# Patient Record
Sex: MALE | Race: WHITE | NOT HISPANIC OR LATINO | Employment: UNEMPLOYED | URBAN - METROPOLITAN AREA
[De-identification: names, ages, dates, MRNs, and addresses within clinical notes are randomized per-mention and may not be internally consistent; named-entity substitution may affect disease eponyms.]

---

## 2020-09-24 ENCOUNTER — OFFICE VISIT (OUTPATIENT)
Dept: GASTROENTEROLOGY | Facility: CLINIC | Age: 40
End: 2020-09-24
Payer: COMMERCIAL

## 2020-09-24 VITALS
SYSTOLIC BLOOD PRESSURE: 124 MMHG | DIASTOLIC BLOOD PRESSURE: 85 MMHG | OXYGEN SATURATION: 98 % | HEART RATE: 76 BPM | HEIGHT: 67 IN | BODY MASS INDEX: 32.8 KG/M2 | RESPIRATION RATE: 18 BRPM | WEIGHT: 209 LBS | TEMPERATURE: 97 F

## 2020-09-24 DIAGNOSIS — K21.9 GASTROESOPHAGEAL REFLUX DISEASE, ESOPHAGITIS PRESENCE NOT SPECIFIED: ICD-10-CM

## 2020-09-24 DIAGNOSIS — R13.10 DYSPHAGIA, UNSPECIFIED TYPE: Primary | ICD-10-CM

## 2020-09-24 PROCEDURE — 99204 PR OFFICE/OUTPT VISIT, NEW, LEVL IV, 45-59 MIN: ICD-10-PCS | Mod: S$GLB,,, | Performed by: INTERNAL MEDICINE

## 2020-09-24 PROCEDURE — 99999 PR PBB SHADOW E&M-NEW PATIENT-LVL IV: CPT | Mod: PBBFAC,,, | Performed by: INTERNAL MEDICINE

## 2020-09-24 PROCEDURE — 99204 OFFICE O/P NEW MOD 45 MIN: CPT | Mod: S$GLB,,, | Performed by: INTERNAL MEDICINE

## 2020-09-24 PROCEDURE — 99999 PR PBB SHADOW E&M-NEW PATIENT-LVL IV: ICD-10-PCS | Mod: PBBFAC,,, | Performed by: INTERNAL MEDICINE

## 2020-09-24 RX ORDER — HYDROXYZINE PAMOATE 25 MG/1
CAPSULE ORAL
COMMUNITY
Start: 2020-08-11

## 2020-09-24 RX ORDER — PANTOPRAZOLE SODIUM 40 MG/1
TABLET, DELAYED RELEASE ORAL
COMMUNITY
Start: 2020-07-14 | End: 2020-09-24 | Stop reason: SDUPTHER

## 2020-09-24 RX ORDER — PANTOPRAZOLE SODIUM 40 MG/1
40 TABLET, DELAYED RELEASE ORAL DAILY
Qty: 90 TABLET | Refills: 3 | Status: SHIPPED | OUTPATIENT
Start: 2020-09-24 | End: 2021-09-24

## 2020-09-24 NOTE — PROGRESS NOTES
Subjective:       Patient ID: Bong Cruz is a 40 y.o. male.    Chief Complaint: Gastroesophageal Reflux (so bad it causes him to vomit after he eats)    He has a history gastroesophageal reflux and has been on the Protonix.  He states he has a long history of pyrosis and dyspepsia.  Recently he had to increase the Protonix to twice a day because the pyrosis and dyspepsia.  He is having episodes of dysphagia that he describes as difficulty with the food going down .  He denies acute obstruction aspiration hematemesis hematochezia jaundice or bleeding.  He describes postprandial vomiting of acidic tasting food.  He states milk sometimes causes the difficulty but he cannot pinpoint a specific food.  He states his symptoms have been going on for 6 months.  He was put on the Protonix because of his pyrosis.  He has never had GI evaluation.  He has daily bowel movements and his weight remains stable he states his mother and father both had have their esophagus stretched .      Allergies:  Review of patient's allergies indicates:  No Known Allergies    Medications:    Current Outpatient Medications:     hydrOXYzine pamoate (VISTARIL) 25 MG Cap, TK 1 C PO QID PRN, Disp: , Rfl:     pantoprazole (PROTONIX) 40 MG tablet, Take 1 tablet (40 mg total) by mouth once daily., Disp: 90 tablet, Rfl: 3    Past Medical History:   Diagnosis Date    GERD (gastroesophageal reflux disease)        Past Surgical History:   Procedure Laterality Date    MANDIBLE SURGERY Bilateral     metal plate both sides    SHOULDER SURGERY Right          Review of Systems   Constitutional: Negative for appetite change, fever and unexpected weight change.   HENT: Positive for trouble swallowing.         No jaundice.   Respiratory: Negative for cough, shortness of breath and wheezing.         He has never been a cigarette smoker.  He denies dysphagia aspiration chronic cough chronic sputum production or dyspnea on exertion.  He does consume alcohol  in the form of beer he uses the smokeless tobacco.   Cardiovascular: Negative for chest pain.        History of hypertension he states has been well controlled.  He denies exertional chest pain or rhythm disturbance.   Gastrointestinal: Positive for nausea and vomiting. Negative for abdominal distention, abdominal pain, anal bleeding, blood in stool, constipation, diarrhea and rectal pain.        He generally has daily bowel movements.  He denies abdominal pain.  He experiences postprandial nausea with vomiting but denies aspiration.   Musculoskeletal: Positive for arthralgias. Negative for back pain and neck pain.   Skin: Negative for pallor and rash.   Neurological: Negative for dizziness, seizures, syncope, speech difficulty, weakness and numbness.        Recent evaluation for headache and facial numbness.  The emergency room evaluation was negative with negative CT scan.   Hematological: Negative for adenopathy.   Psychiatric/Behavioral: Negative for confusion.        He states his of the usual stresses.  He is a  works on the farm.       Objective:      Physical Exam  Vitals signs reviewed.   Constitutional:       Appearance: He is well-developed.      Comments: Well-nourished well-hydrated overweight afebrile nonicteric white male.  He is normocephalic.  Pupils are normal.  He is sitting comfortably in the chair.  He is oriented x3.  He can relate his history and answer questions appropriately.   HENT:      Head: Normocephalic.   Eyes:      Pupils: Pupils are equal, round, and reactive to light.   Neck:      Musculoskeletal: Normal range of motion and neck supple.      Thyroid: No thyromegaly.      Trachea: No tracheal deviation.   Cardiovascular:      Rate and Rhythm: Normal rate and regular rhythm.      Heart sounds: Normal heart sounds.   Pulmonary:      Effort: Pulmonary effort is normal.      Breath sounds: Normal breath sounds.   Abdominal:      General: Bowel sounds are normal. There is no  distension.      Palpations: Abdomen is soft. There is no mass.      Tenderness: There is no abdominal tenderness. There is no guarding or rebound.      Hernia: No hernia is present.      Comments: The abdomen is soft obese without tenderness masses organomegaly.  Bowel sounds are normal.   Musculoskeletal: Normal range of motion.      Comments: He can ambulate normally.  He can go from the sitting the standing position without difficulty.   Lymphadenopathy:      Cervical: No cervical adenopathy.   Skin:     General: Skin is warm and dry.   Neurological:      Mental Status: He is alert and oriented to person, place, and time.      Cranial Nerves: No cranial nerve deficit.   Psychiatric:         Behavior: Behavior normal.           Plan:       Dysphagia, unspecified type  -     FL Upper GI; Future; Expected date: 09/24/2020    Gastroesophageal reflux disease, esophagitis presence not specified  -     pantoprazole (PROTONIX) 40 MG tablet; Take 1 tablet (40 mg total) by mouth once daily.  Dispense: 90 tablet; Refill: 3     He will continue his reflux regimen and the Protonix.  Barium swallow and upper GI x-rays will be scheduled.  He will attempt to obtain further medical history from his parents who had dysphagia.  He will make a food diary and avoid the offending foods.  I have discussed with him the adverse effects of abdominal adiposity.  He will try to decrease his caloric intake.  He continues his vitamins and minerals.

## 2020-09-24 NOTE — LETTER
September 24, 2020      Eyal Pfeiffer, BRIELLE  [address] [facility] [place] [id number]           [facility]  [address], [address]  [place] [address]  Phone: [phone]  Fax: [phone]          Patient: Bong Cruz   MR Number: [id number]   YOB: 1980   Date of Visit: 9/24/2020       Dear Eyal Pfeiffer:    Thank you for referring Bong Cruz to me for evaluation. Attached you will find relevant portions of my assessment and plan of care.    If you have questions, please do not hesitate to call me. I look forward to following Bong Cruz along with you.    Sincerely,    Satya Matson MD    Enclosure  CC:  No Recipients    If you would like to receive this communication electronically, please contact [email or web address] or [phone] to request more information on [employer] Link access.    For providers and/or their staff who would like to refer a patient to [facility], please contact us through our one-stop-shop provider referral line, [facility], at [phone].    If you feel you have received this communication in error or would no longer like to receive these types of communications, please e-mail [email or web address]

## 2020-09-24 NOTE — PATIENT INSTRUCTIONS
He will continue his reflux regimen.  He will be scheduled for upper GI x-rays to evaluate the dysphagia.  He can continue the Protonix twice a day.  He will try to make a food diary and pinpoint the offending foods that cause the difficulty with the nausea vomiting

## 2020-09-30 ENCOUNTER — HOSPITAL ENCOUNTER (OUTPATIENT)
Dept: RADIOLOGY | Facility: HOSPITAL | Age: 40
Discharge: HOME | End: 2020-09-30
Attending: INTERNAL MEDICINE
Payer: COMMERCIAL

## 2020-09-30 DIAGNOSIS — R13.10 DYSPHAGIA, UNSPECIFIED TYPE: ICD-10-CM

## 2020-09-30 PROCEDURE — 25500020 PHARM REV CODE 255: Performed by: INTERNAL MEDICINE

## 2020-09-30 PROCEDURE — 74240 X-RAY XM UPR GI TRC 1CNTRST: CPT | Mod: 26,,, | Performed by: RADIOLOGY

## 2020-09-30 PROCEDURE — 74240 FL UPPER GI: ICD-10-PCS | Mod: 26,,, | Performed by: RADIOLOGY

## 2020-09-30 PROCEDURE — A9698 NON-RAD CONTRAST MATERIALNOC: HCPCS | Performed by: INTERNAL MEDICINE

## 2020-09-30 PROCEDURE — 74240 X-RAY XM UPR GI TRC 1CNTRST: CPT | Mod: TC,FY

## 2020-09-30 RX ADMIN — BARIUM SULFATE 140 ML: 980 POWDER, FOR SUSPENSION ORAL at 10:09

## 2020-09-30 RX ADMIN — BARIUM SULFATE 355 ML: 0.6 SUSPENSION ORAL at 10:09

## 2020-10-02 DIAGNOSIS — K21.9 GASTROESOPHAGEAL REFLUX DISEASE, UNSPECIFIED WHETHER ESOPHAGITIS PRESENT: Primary | ICD-10-CM

## 2020-10-02 DIAGNOSIS — K44.9 HIATAL HERNIA: ICD-10-CM

## 2020-10-02 DIAGNOSIS — Z01.818 PREOP TESTING: ICD-10-CM

## 2020-10-02 DIAGNOSIS — K21.9 GERD (GASTROESOPHAGEAL REFLUX DISEASE): ICD-10-CM

## 2020-10-13 ENCOUNTER — LAB VISIT (OUTPATIENT)
Dept: FAMILY MEDICINE | Facility: CLINIC | Age: 40
End: 2020-10-13
Payer: COMMERCIAL

## 2020-10-13 DIAGNOSIS — Z01.818 PREOP TESTING: ICD-10-CM

## 2020-10-13 LAB — SARS-COV-2 RNA RESP QL NAA+PROBE: NOT DETECTED

## 2020-10-13 PROCEDURE — U0003 INFECTIOUS AGENT DETECTION BY NUCLEIC ACID (DNA OR RNA); SEVERE ACUTE RESPIRATORY SYNDROME CORONAVIRUS 2 (SARS-COV-2) (CORONAVIRUS DISEASE [COVID-19]), AMPLIFIED PROBE TECHNIQUE, MAKING USE OF HIGH THROUGHPUT TECHNOLOGIES AS DESCRIBED BY CMS-2020-01-R: HCPCS

## 2020-10-16 ENCOUNTER — HOSPITAL ENCOUNTER (OUTPATIENT)
Facility: HOSPITAL | Age: 40
Discharge: HOME | End: 2020-10-16
Attending: INTERNAL MEDICINE | Admitting: INTERNAL MEDICINE
Payer: COMMERCIAL

## 2020-10-16 ENCOUNTER — ANESTHESIA EVENT (OUTPATIENT)
Dept: SURGERY | Facility: HOSPITAL | Age: 40
End: 2020-10-16
Payer: COMMERCIAL

## 2020-10-16 ENCOUNTER — ANESTHESIA (OUTPATIENT)
Dept: SURGERY | Facility: HOSPITAL | Age: 40
End: 2020-10-16
Payer: COMMERCIAL

## 2020-10-16 VITALS
HEART RATE: 81 BPM | OXYGEN SATURATION: 100 % | WEIGHT: 200 LBS | DIASTOLIC BLOOD PRESSURE: 90 MMHG | SYSTOLIC BLOOD PRESSURE: 110 MMHG | TEMPERATURE: 98 F | RESPIRATION RATE: 19 BRPM | BODY MASS INDEX: 31.39 KG/M2 | HEIGHT: 67 IN

## 2020-10-16 DIAGNOSIS — K21.9 GERD (GASTROESOPHAGEAL REFLUX DISEASE): ICD-10-CM

## 2020-10-16 DIAGNOSIS — K21.9 GASTROESOPHAGEAL REFLUX DISEASE, UNSPECIFIED WHETHER ESOPHAGITIS PRESENT: ICD-10-CM

## 2020-10-16 DIAGNOSIS — K44.9 HIATAL HERNIA: ICD-10-CM

## 2020-10-16 PROCEDURE — D9220A PRA ANESTHESIA: ICD-10-PCS | Mod: ,,, | Performed by: ANESTHESIOLOGY

## 2020-10-16 PROCEDURE — 37000008 HC ANESTHESIA 1ST 15 MINUTES: Performed by: INTERNAL MEDICINE

## 2020-10-16 PROCEDURE — 99900103 DSU ONLY-NO CHARGE-INITIAL HR (STAT)

## 2020-10-16 PROCEDURE — D9220A PRA ANESTHESIA: Mod: ,,, | Performed by: ANESTHESIOLOGY

## 2020-10-16 PROCEDURE — 43239 EGD BIOPSY SINGLE/MULTIPLE: CPT | Performed by: INTERNAL MEDICINE

## 2020-10-16 PROCEDURE — 25000003 PHARM REV CODE 250: Performed by: NURSE ANESTHETIST, CERTIFIED REGISTERED

## 2020-10-16 PROCEDURE — 88305 TISSUE EXAM BY PATHOLOGIST: CPT | Mod: 26,,, | Performed by: PATHOLOGY

## 2020-10-16 PROCEDURE — 43239 EGD BIOPSY SINGLE/MULTIPLE: CPT | Mod: ,,, | Performed by: INTERNAL MEDICINE

## 2020-10-16 PROCEDURE — 27201423 OPTIME MED/SURG SUP & DEVICES STERILE SUPPLY: Performed by: INTERNAL MEDICINE

## 2020-10-16 PROCEDURE — 88305 TISSUE EXAM BY PATHOLOGIST: CPT | Performed by: PATHOLOGY

## 2020-10-16 PROCEDURE — 88305 TISSUE EXAM BY PATHOLOGIST: ICD-10-PCS | Mod: 26,,, | Performed by: PATHOLOGY

## 2020-10-16 PROCEDURE — 43239 PR EGD, FLEX, W/BIOPSY, SGL/MULTI: ICD-10-PCS | Mod: ,,, | Performed by: INTERNAL MEDICINE

## 2020-10-16 PROCEDURE — 37000009 HC ANESTHESIA EA ADD 15 MINS: Performed by: INTERNAL MEDICINE

## 2020-10-16 PROCEDURE — 63600175 PHARM REV CODE 636 W HCPCS: Performed by: NURSE ANESTHETIST, CERTIFIED REGISTERED

## 2020-10-16 RX ORDER — SODIUM CHLORIDE, SODIUM LACTATE, POTASSIUM CHLORIDE, CALCIUM CHLORIDE 600; 310; 30; 20 MG/100ML; MG/100ML; MG/100ML; MG/100ML
125 INJECTION, SOLUTION INTRAVENOUS CONTINUOUS
Status: DISCONTINUED | OUTPATIENT
Start: 2020-10-16 | End: 2020-10-16 | Stop reason: HOSPADM

## 2020-10-16 RX ORDER — LIDOCAINE HYDROCHLORIDE 10 MG/ML
1 INJECTION, SOLUTION EPIDURAL; INFILTRATION; INTRACAUDAL; PERINEURAL ONCE
Status: CANCELLED | OUTPATIENT
Start: 2020-10-16 | End: 2020-10-16

## 2020-10-16 RX ORDER — SODIUM CHLORIDE, SODIUM LACTATE, POTASSIUM CHLORIDE, CALCIUM CHLORIDE 600; 310; 30; 20 MG/100ML; MG/100ML; MG/100ML; MG/100ML
INJECTION, SOLUTION INTRAVENOUS CONTINUOUS PRN
Status: DISCONTINUED | OUTPATIENT
Start: 2020-10-16 | End: 2020-10-16

## 2020-10-16 RX ORDER — ONDANSETRON 2 MG/ML
4 INJECTION INTRAMUSCULAR; INTRAVENOUS DAILY PRN
Status: DISCONTINUED | OUTPATIENT
Start: 2020-10-16 | End: 2020-10-16 | Stop reason: HOSPADM

## 2020-10-16 RX ORDER — LIDOCAINE HYDROCHLORIDE 20 MG/ML
INJECTION INTRAVENOUS
Status: DISCONTINUED | OUTPATIENT
Start: 2020-10-16 | End: 2020-10-16

## 2020-10-16 RX ORDER — PROPOFOL 10 MG/ML
VIAL (ML) INTRAVENOUS
Status: DISCONTINUED | OUTPATIENT
Start: 2020-10-16 | End: 2020-10-16

## 2020-10-16 RX ORDER — SODIUM CHLORIDE, SODIUM LACTATE, POTASSIUM CHLORIDE, CALCIUM CHLORIDE 600; 310; 30; 20 MG/100ML; MG/100ML; MG/100ML; MG/100ML
INJECTION, SOLUTION INTRAVENOUS CONTINUOUS
Status: CANCELLED | OUTPATIENT
Start: 2020-10-16

## 2020-10-16 RX ADMIN — PROPOFOL 50 MG: 10 INJECTION, EMULSION INTRAVENOUS at 01:10

## 2020-10-16 RX ADMIN — LIDOCAINE HYDROCHLORIDE 100 MG: 20 INJECTION, SOLUTION INTRAVENOUS at 01:10

## 2020-10-16 RX ADMIN — SODIUM CHLORIDE, POTASSIUM CHLORIDE, SODIUM LACTATE AND CALCIUM CHLORIDE: 600; 310; 30; 20 INJECTION, SOLUTION INTRAVENOUS at 01:10

## 2020-10-16 NOTE — TRANSFER OF CARE
"Anesthesia Transfer of Care Note    Patient: Bnog Cruz    Procedure(s) Performed: Procedure(s) (LRB):  EGD (ESOPHAGOGASTRODUODENOSCOPY) with GASTRIC BIOPSY (N/A)    Patient location: PACU    Anesthesia Type: general    Transport from OR: Transported from OR on room air with adequate spontaneous ventilation    Post pain: adequate analgesia    Post assessment: no apparent anesthetic complications and tolerated procedure well    Post vital signs: stable    Level of consciousness: awake, oriented and alert    Nausea/Vomiting: no nausea/vomiting    Complications: none    Transfer of care protocol was followed      Last vitals:   Visit Vitals  BP (!) 127/93   Pulse 66   Temp 36.6 °C (97.9 °F) (Oral)   Resp (!) 24   Ht 5' 7" (1.702 m)   Wt 90.7 kg (200 lb)   SpO2 100%   BMI 31.32 kg/m²     "

## 2020-10-16 NOTE — ANESTHESIA PREPROCEDURE EVALUATION
10/16/2020  Bong Cruz is a 40 y.o., male.    Anesthesia Evaluation    I have reviewed the Patient Summary Reports.    I have reviewed the Nursing Notes.    I have reviewed the Medications.     Review of Systems  Anesthesia Hx:  No problems with previous Anesthesia Hx of Anesthetic complications   Grandfather has Malignant Hyperthermia Neg history of prior surgery. Denies Family Hx of Anesthesia complications.   Denies Personal Hx of Anesthesia complications.   Social:  Non-Smoker    Hematology/Oncology:  Hematology Normal   Oncology Normal     EENT/Dental:EENT/Dental Normal   Cardiovascular:  Cardiovascular Normal     Pulmonary:  Pulmonary Normal    Renal/:  Renal/ Normal     Hepatic/GI:   GERD, poorly controlled    Musculoskeletal:  Musculoskeletal Normal    Neurological:  Neurology Normal    Endocrine:  Endocrine Normal    Dermatological:  Skin Normal    Psych:  Psychiatric Normal           Physical Exam  General:  Well nourished    Airway/Jaw/Neck:  Airway Findings: Mouth Opening: Normal Tongue: Normal  General Airway Assessment: Adult  Mallampati: II  TM Distance: 4 - 6 cm        Eyes/Ears/Nose:  EYES/EARS/NOSE FINDINGS: Normal   Dental:  DENTAL FINDINGS: Normal   Chest/Lungs:  Chest/Lungs Clear    Heart/Vascular:  Heart Findings: Normal Heart murmur: negative Vascular Findings: Normal    Abdomen:  Abdomen Findings: Normal    Musculoskeletal:  Musculoskeletal Findings: Normal   Skin:  Skin Findings: Normal    Mental Status:  Mental Status Findings: Normal        Anesthesia Plan  Type of Anesthesia, risks & benefits discussed:  Anesthesia Type:  general  Patient's Preference:   Intra-op Monitoring Plan: standard ASA monitors  Intra-op Monitoring Plan Comments:   Post Op Pain Control Plan:   Post Op Pain Control Plan Comments:   Induction:   IV  Beta Blocker:  Patient is not currently on a  Beta-Blocker (No further documentation required).       Informed Consent: Patient understands risks and agrees with Anesthesia plan.  Questions answered. Anesthesia consent signed with patient.  ASA Score: 2     Day of Surgery Review of History & Physical: I have interviewed and examined the patient. I have reviewed the patient's H&P dated:    H&P update referred to the provider.         Ready For Surgery From Anesthesia Perspective.

## 2020-10-16 NOTE — PLAN OF CARE
To PACU via stretcher s/p EGD. Connected to PACU monitors-alarms on. Awake and talkative. IV site to right hand. No noted problems to site. IVF infusing via gravity at KVO. AVSS. Will monitor closely.

## 2020-10-16 NOTE — OP NOTE
Procedure.  Esophageal gastroduodenoscopy.  Indications dyspepsia pyrosis and abdominal pain with the discomfort and regurgitation.  He has good health knowledge.  He understands the procedures of upper endoscopy.  Specifically he realizes there is an extremely small incidence of bleeding perforation or aspiration.  Anesthesia mac.  Procedure.  With the patient the procedure room left lateral supine position.  The Olympus video upper endoscope was passed without difficulty.  The hyperemic gastroesophageal junction was at 40-41 cm with the SQ junction at 40-41 cm.  The scope was passed in the stomach.  The the cardia body and antrum were diffusely hyperemic.  There is minimal retained secretions.  The friable pre-pyloric mucosa was biopsied.  Biopsies were obtained from the lesser curvature greater curvature and antrum.  There was minimal deformity of the pylorus.  First and parts of the duodenum were normal.  Impression 1.  Esophagitis LA grade A 2.  Gastritis.  Patient tolerated the procedure well.

## 2020-10-16 NOTE — [employer] PATIENT INSTRUCTIONS
Discharge Summary/Instructions after an Endoscopic Procedure  Patient Name: Bong Cruz  Patient MRN: [id number]  Patient YOB: 1980 Friday, October 16, 2020  Satya Matson MD  RESTRICTIONS:  During your procedure today, you received medications for sedation.  These   medications may affect your judgment, balance and coordination.  Therefore,   for 24 hours, you have the following restrictions:   - DO NOT drive a car, operate machinery, make legal/financial decisions,   sign important papers or drink alcohol.    ACTIVITY:  Today: no heavy lifting, straining or running due to procedural   sedation/anesthesia.  The following day: return to full activity including work.  DIET:  Eat and drink normally unless instructed otherwise.     TREATMENT FOR COMMON SIDE EFFECTS:  - Mild abdominal pain, nausea, belching, bloating or excessive gas:  rest,   eat lightly and use a heating pad.  - Sore Throat: treat with throat lozenges and/or gargle with warm salt   water.  - Because air was used during the procedure, expelling large amounts of air   from your rectum or belching is normal.  - If a bowel prep was taken, you may not have a bowel movement for 1-3 days.    This is normal.  SYMPTOMS TO WATCH FOR AND REPORT TO YOUR PHYSICIAN:  1. Abdominal pain or bloating, other than gas cramps.  2. Chest pain.  3. Back pain.  4. Signs of infection such as: chills or fever occurring within 24 hours   after the procedure.  5. Rectal bleeding, which would show as bright red, maroon, or black stools.   (A tablespoon of blood from the rectum is not serious, especially if   hemorrhoids are present.)  6. Vomiting.  7. Weakness or dizziness.  GO DIRECTLY TO THE NEAREST EMERGENCY ROOM IF YOU HAVE ANY OF THE FOLLOWING:      Difficulty breathing              Chills and/or fever over 101 F   Persistent vomiting and/or vomiting blood   Severe abdominal pain   Severe chest pain   Black, tarry stools   Bleeding- more than one tablespoon   Any  other symptom or condition that you feel may need urgent attention  Your doctor recommends these additional instructions:  If any biopsies were taken, your doctors clinic will contact you in 1 to 2   weeks with any results.  - Discharge patient to home (ambulatory).   - Resume previous diet.  For questions, problems or results please call your physician - Satya Matson MD at Work:  [phone].  [facility] EMERGENCY ROOM PHONE NUMBER: [phone]  IF A COMPLICATION OR EMERGENCY SITUATION ARISES AND YOU ARE UNABLE TO REACH   YOUR PHYSICIAN - GO DIRECTLY TO THE EMERGENCY ROOM.  MD Satya George MD  10/16/2020 1:31:36 PM  This report has been verified and signed electronically.  [employer]

## 2020-10-16 NOTE — H&P
"Office Visit    9/24/2020  [facility]     Satya Matson MD  Gastroenterology  Dysphagia, unspecified type +1 more  Dx  Gastroesophageal Reflux   ; Referred by Eyal Pfeiffer NP  Reason for Visit   Additional Documentation    Vitals:    /85 (BP Location: Right arm, Patient Position: Sitting, BP Method: Medium (Automatic))   Pulse 76   Temp 97.3 °F (36.3 °C) (Temporal)   Resp 18   Ht 5' 7" (1.702 m)   Wt 94.8 kg (209 lb)   SpO2 98%   BMI 32.73 kg/m²   BSA 2.12 m²      More Vitals   Flowsheets:    Anthropometrics      SmartForms:     [facility] AMB - FALL RISK      Encounter Info:    Billing Info,   History,   Allergies,   Detailed Report      Instructions       Follow up in about 2 weeks (around 10/8/2020).  He will continue his reflux regimen.  He will be scheduled for upper GI x-rays to evaluate the dysphagia.  He can continue the Protonix twice a day.  He will try to make a food diary and pinpoint the offending foods that cause the difficulty with the nausea vomiting           After Visit Summary (Printed 9/24/2020)  Progress Notes  Satya Matson MD (Physician)   Gastroenterology   9/24/2020  8:00 AM   Signed     Subjective:       Patient ID: Bong Cruz is a 40 y.o. male.     Chief Complaint: Gastroesophageal Reflux (so bad it causes him to vomit after he eats)     He has a history gastroesophageal reflux and has been on the Protonix.  He states he has a long history of pyrosis and dyspepsia.  Recently he had to increase the Protonix to twice a day because the pyrosis and dyspepsia.  He is having episodes of dysphagia that he describes as difficulty with the food going down .  He denies acute obstruction aspiration hematemesis hematochezia jaundice or bleeding.  He describes postprandial vomiting of acidic tasting food.  He states milk sometimes causes the difficulty but he cannot pinpoint a specific food.  He states his symptoms have been going on for 6 months.  He " was put on the Protonix because of his pyrosis.  He has never had GI evaluation.  He has daily bowel movements and his weight remains stable he states his mother and father both had have their esophagus stretched .        Allergies:  Review of patient's allergies indicates:  No Known Allergies     Medications:     Current Outpatient Medications:     hydrOXYzine pamoate (VISTARIL) 25 MG Cap, TK 1 C PO QID PRN, Disp: , Rfl:     pantoprazole (PROTONIX) 40 MG tablet, Take 1 tablet (40 mg total) by mouth once daily., Disp: 90 tablet, Rfl: 3          Past Medical History:   Diagnosis Date    GERD (gastroesophageal reflux disease)                 Past Surgical History:   Procedure Laterality Date    MANDIBLE SURGERY Bilateral       metal plate both sides    SHOULDER SURGERY Right              Review of Systems   Constitutional: Negative for appetite change, fever and unexpected weight change.   HENT: Positive for trouble swallowing.         No jaundice.   Respiratory: Negative for cough, shortness of breath and wheezing.         He has never been a cigarette smoker.  He denies dysphagia aspiration chronic cough chronic sputum production or dyspnea on exertion.  He does consume alcohol in the form of beer he uses the smokeless tobacco.   Cardiovascular: Negative for chest pain.        History of hypertension he states has been well controlled.  He denies exertional chest pain or rhythm disturbance.   Gastrointestinal: Positive for nausea and vomiting. Negative for abdominal distention, abdominal pain, anal bleeding, blood in stool, constipation, diarrhea and rectal pain.        He generally has daily bowel movements.  He denies abdominal pain.  He experiences postprandial nausea with vomiting but denies aspiration.   Musculoskeletal: Positive for arthralgias. Negative for back pain and neck pain.   Skin: Negative for pallor and rash.   Neurological: Negative for dizziness, seizures, syncope, speech difficulty,  weakness and numbness.        Recent evaluation for headache and facial numbness.  The emergency room evaluation was negative with negative CT scan.   Hematological: Negative for adenopathy.   Psychiatric/Behavioral: Negative for confusion.        He states his of the usual stresses.  He is a  works on the farm.       Objective:   Physical Exam  Vitals signs reviewed.   Constitutional:       Appearance: He is well-developed.      Comments: Well-nourished well-hydrated overweight afebrile nonicteric white male.  He is normocephalic.  Pupils are normal.  He is sitting comfortably in the chair.  He is oriented x3.  He can relate his history and answer questions appropriately.   HENT:      Head: Normocephalic.   Eyes:      Pupils: Pupils are equal, round, and reactive to light.   Neck:      Musculoskeletal: Normal range of motion and neck supple.      Thyroid: No thyromegaly.      Trachea: No tracheal deviation.   Cardiovascular:      Rate and Rhythm: Normal rate and regular rhythm.      Heart sounds: Normal heart sounds.   Pulmonary:      Effort: Pulmonary effort is normal.      Breath sounds: Normal breath sounds.   Abdominal:      General: Bowel sounds are normal. There is no distension.      Palpations: Abdomen is soft. There is no mass.      Tenderness: There is no abdominal tenderness. There is no guarding or rebound.      Hernia: No hernia is present.      Comments: The abdomen is soft obese without tenderness masses organomegaly.  Bowel sounds are normal.   Musculoskeletal: Normal range of motion.      Comments: He can ambulate normally.  He can go from the sitting the standing position without difficulty.   Lymphadenopathy:      Cervical: No cervical adenopathy.   Skin:     General: Skin is warm and dry.   Neurological:      Mental Status: He is alert and oriented to person, place, and time.      Cranial Nerves: No cranial nerve deficit.   Psychiatric:         Behavior: Behavior normal.              Plan:       Dysphagia, unspecified type  -     FL Upper GI; Future; Expected date: 09/24/2020     Gastroesophageal reflux disease, esophagitis presence not specified  -     pantoprazole (PROTONIX) 40 MG tablet; Take 1 tablet (40 mg total) by mouth once daily.  Dispense: 90 tablet; Refill: 3      He will continue his reflux regimen and the Protonix.  Barium swallow and upper GI x-rays will be scheduled.  He will attempt to obtain further medical history from his parents who had dysphagia.  He will make a food diary and avoid the offending foods.  I have discussed with him the adverse effects of abdominal adiposity.  He will try to decrease his caloric intake.  He continues his vitamins and minerals.             Other Notes     All notes  Communications       Letter sent to Eyal Pfeiffer NP    AMB Visit Summary: Provider Version  New Media    Scan on 9/24/2020 7:49 AM by Laurel Munoz   Not recorded   All Charges for This Encounter    Code Description Service Date Service Provider Modifiers Qty   18150 [place] OFFICE/OUTPT VISIT,GALINALEVL IV 9/24/2020 Satya Matson MD S$GLB 1   [id number] LA PBB SHADOW E&M-NEW PATIENT-LVL IV 9/24/2020 Satya Matson MD PBBFAC 1   Level of Service    Level of Service   LA OFFICE/OUTPT VISIT,FRANCIA MOJICAL IV [11251]   BestPractice Advisories    Click to view BestPractice Advisory history   AVS Reports    Date/Time Report Action User   9/24/2020  8:21 AM After Visit Summary Printed Pratibha Cevallos LPN   Encounter-Level Documents - 09/24/2020:    After Visit Summary - Document on 9/24/2020 8:21 AM by Pratibha Cevallos LPN: After Visit Summary  Orders Placed       FL Upper GI  Medication Changes       pantoprazole sodium 40 mg Oral Daily      Medication List   Fall Risk    Patient Mobility Status: Ambulatory   Number of falls in the past 12 months?: 0   Fall Risk?: No  Visit Diagnoses       Dysphagia, unspecified type     Gastroesophageal reflux disease, esophagitis presence not specified      Problem List   He is here for upper endoscopy.  He has had pyrosis dyspepsia and occasional dysphagia.  He appears to have gastroesophageal reflux and possibly a motility disorder of the esophagus.  He has good health knowledge.  He understands the procedures of upper endoscopy and esophageal dilatation.  Specifically he realizes there is an extremely small incidence of bleeding perforation or aspiration.  History and physical are unchanged.  Physical examination.  Well-nourished well-hydrated nonicteric male.  He is normocephalic.  Pupils are normal.  Lungs reveal symmetrical respirations.  Heart reveals regular rhythm.  The abdomen is soft with mild tenderness in the epigastrium.  Organomegaly masses are not detected.  Bowel sounds are.  M pressure 1.  Gastroesophageal reflux 2.  Dysphagia.  He

## 2020-10-16 NOTE — DISCHARGE SUMMARY
[facility]  Discharge Note  Short Stay    Procedure(s) (LRB):  EGD (ESOPHAGOGASTRODUODENOSCOPY) with GASTRIC BIOPSY (N/A)    OUTCOME: Patient tolerated treatment/procedure well without complication and is now ready for discharge.    DISPOSITION: Home or Self Care    FINAL DIAGNOSIS:  <principal problem not specified>    FOLLOWUP: In clinic    DISCHARGE INSTRUCTIONS:  No discharge procedures on file.    Upper endoscopy revealed gastroesophageal reflux and gastritis.  Biopsies are pending.  He will elevate the head of his bed he continues his current medications.  He will add adequate fiber to produced daily bowel movements.  He continues his vitamins and minerals.  He will make a food diary and avoid the offending foods.  He has a followup upon the office.

## 2020-10-17 NOTE — ANESTHESIA POSTPROCEDURE EVALUATION
Anesthesia Post Evaluation    Patient: Bong Cruz    Procedure(s) Performed: Procedure(s) (LRB):  EGD (ESOPHAGOGASTRODUODENOSCOPY) with GASTRIC BIOPSY (N/A)    Final Anesthesia Type: general    Patient location during evaluation: PACU  Patient participation: Yes- Able to Participate  Level of consciousness: awake and awake and alert  Post-procedure vital signs: reviewed and stable  Pain management: adequate  Airway patency: patent    PONV status at discharge: No PONV  Anesthetic complications: no      Cardiovascular status: blood pressure returned to baseline  Respiratory status: unassisted and spontaneous ventilation  Hydration status: euvolemic  Follow-up not needed.          Vitals Value Taken Time   /90 10/16/20 1350   Temp 36.6 °C (97.8 °F) 10/16/20 1330   Pulse 81 10/16/20 1355   Resp 19 10/16/20 1355   SpO2 100 % 10/16/20 1355         Event Time   Out of Recovery 13:34:54         Pain/Tash Score: Tash Score: 10 (10/16/2020  1:35 PM)  Modified Tash Score: 19 (10/16/2020  1:59 PM)

## 2020-10-21 LAB
FINAL PATHOLOGIC DIAGNOSIS: NORMAL
GROSS: NORMAL
Lab: NORMAL

## 2020-10-23 ENCOUNTER — OFFICE VISIT (OUTPATIENT)
Dept: GASTROENTEROLOGY | Facility: CLINIC | Age: 40
End: 2020-10-23
Payer: COMMERCIAL

## 2020-10-23 VITALS
RESPIRATION RATE: 18 BRPM | WEIGHT: 209 LBS | BODY MASS INDEX: 32.8 KG/M2 | SYSTOLIC BLOOD PRESSURE: 140 MMHG | HEIGHT: 67 IN | OXYGEN SATURATION: 98 % | TEMPERATURE: 98 F | DIASTOLIC BLOOD PRESSURE: 87 MMHG | HEART RATE: 73 BPM

## 2020-10-23 DIAGNOSIS — R13.19 ESOPHAGEAL DYSPHAGIA: ICD-10-CM

## 2020-10-23 DIAGNOSIS — K21.9 GASTROESOPHAGEAL REFLUX DISEASE, UNSPECIFIED WHETHER ESOPHAGITIS PRESENT: ICD-10-CM

## 2020-10-23 DIAGNOSIS — R11.0 NAUSEA: ICD-10-CM

## 2020-10-23 DIAGNOSIS — R10.9 ABDOMINAL PAIN, UNSPECIFIED ABDOMINAL LOCATION: Primary | ICD-10-CM

## 2020-10-23 DIAGNOSIS — K21.9 GASTROESOPHAGEAL REFLUX DISEASE WITHOUT ESOPHAGITIS: ICD-10-CM

## 2020-10-23 PROCEDURE — 99999 PR PBB SHADOW E&M-EST. PATIENT-LVL IV: CPT | Mod: PBBFAC,,, | Performed by: INTERNAL MEDICINE

## 2020-10-23 PROCEDURE — 99214 OFFICE O/P EST MOD 30 MIN: CPT | Mod: S$GLB,,, | Performed by: INTERNAL MEDICINE

## 2020-10-23 PROCEDURE — 99999 PR PBB SHADOW E&M-EST. PATIENT-LVL IV: ICD-10-PCS | Mod: PBBFAC,,, | Performed by: INTERNAL MEDICINE

## 2020-10-23 PROCEDURE — 99214 PR OFFICE/OUTPT VISIT, EST, LEVL IV, 30-39 MIN: ICD-10-PCS | Mod: S$GLB,,, | Performed by: INTERNAL MEDICINE

## 2020-10-23 RX ORDER — PROMETHAZINE HYDROCHLORIDE 50 MG/1
50 TABLET ORAL EVERY 6 HOURS PRN
Qty: 100 TABLET | Refills: 0 | Status: SHIPPED | OUTPATIENT
Start: 2020-10-23 | End: 2020-11-22

## 2020-10-23 RX ORDER — SUCRALFATE 1 G/1
1 TABLET ORAL
Qty: 270 TABLET | Refills: 3 | Status: SHIPPED | OUTPATIENT
Start: 2020-10-23 | End: 2021-10-23

## 2020-10-23 NOTE — PATIENT INSTRUCTIONS
He will continue the Protonix and the reflux regimen.  He will avoid milk and milk products.  He will make a food diary and avoid the offending foods.  He is given the Phenergan for the nausea.  Ultrasound will be scheduled to evaluate the gallbladder.  He still has episodic dysphagia and esophageal motility study will be scheduled.  He is started on the Carafate before meals.  He continues his other medications vitamins and minerals.

## 2020-10-23 NOTE — LETTER
October 27, 2020      Eyal Pfeiffer, BRIELLE  [address] [facility] [place] [id number]           [facility]  [address], [address]  [place] [address]  Phone: [phone]  Fax: [phone]          Patient: Bong Cruz   MR Number: [id number]   YOB: 1980   Date of Visit: 10/23/2020       Dear Eyal Pfeiffer:    Thank you for referring Bogn Cruz to me for evaluation. Attached you will find relevant portions of my assessment and plan of care.    If you have questions, please do not hesitate to call me. I look forward to following Bong Cruz along with you.    Sincerely,    Satya Matson MD    Enclosure  CC:  No Recipients    If you would like to receive this communication electronically, please contact [email or web address] or [phone] to request more information on [facility] access.    For providers and/or their staff who would like to refer a patient to [facility], please contact us through our one-stop-shop provider referral line, [facility], at [phone].    If you feel you have received this communication in error or would no longer like to receive these types of communications, please e-mail [email or web address]

## 2020-10-23 NOTE — PROGRESS NOTES
Subjective:       Patient ID: Bong Cruz is a 40 y.o. male.    Chief Complaint: Follow-up (procedure)    Upper endoscopy revealed gastroesophageal reflux and gastritis.  The biopsies were negative for H pylori.  The barium swallow showed a small hiatal.  Obstruction was not seen in the esophagus.  The barium swallow revealed that there was transient delay of the barium tablet in the hypopharynx.  He denies aspiration.  He denies difficulty with solids or liquids but has nonspecific findings of the food goes down slowly .  He has had nausea.  He has occasional vomiting.  His mother states that she has severe indigestion heartburn and there is a family history of chronic cholecystitis and gallstones.  He has had nonspecific abdominal pain of both upper quadrants and the right upper quadrant.  He denies hematemesis hematochezia jaundice or bleeding.  He denies fever chills.  He generally has daily bowel movements.  The mother states as a child he was intolerant to milk.  He states that he cannot tolerate milk or milk products or sweet or sugary foods.  These foods cause the nausea and vomiting.  He denies aspiration.  He denies fever chills.  His weight remains stable in is physically active.      Allergies:  Review of patient's allergies indicates:  No Known Allergies    Medications:    Current Outpatient Medications:     hydrOXYzine pamoate (VISTARIL) 25 MG Cap, TK 1 C PO QID PRN, Disp: , Rfl:     pantoprazole (PROTONIX) 40 MG tablet, Take 1 tablet (40 mg total) by mouth once daily., Disp: 90 tablet, Rfl: 3    promethazine (PHENERGAN) 50 MG tablet, Take 1 tablet (50 mg total) by mouth every 6 (six) hours as needed for Nausea., Disp: 100 tablet, Rfl: 0    sucralfate (CARAFATE) 1 gram tablet, Take 1 tablet (1 g total) by mouth 3 (three) times daily before meals., Disp: 270 tablet, Rfl: 3    Past Medical History:   Diagnosis Date    GERD (gastroesophageal reflux disease)        Past Surgical History:    Procedure Laterality Date    ESOPHAGOGASTRODUODENOSCOPY N/A 10/16/2020    Procedure: EGD (ESOPHAGOGASTRODUODENOSCOPY) with GASTRIC BIOPSY;  Surgeon: Satya Matson MD;  Location: [facility];  Service: Endoscopy;  Laterality: N/A;    MANDIBLE SURGERY Bilateral     metal plate both sides    SHOULDER SURGERY Right          Review of Systems   Constitutional: Negative for appetite change, fever and unexpected weight change.   HENT: Positive for trouble swallowing.         No jaundice.   Respiratory: Negative for cough, shortness of breath and wheezing.         He denies cigarettes.  He uses smokeless tobacco.  He is consumed a small amount on alcohol.  He does not associate the alcohol usage with these symptoms.  He denies dysphagia aspiration hemoptysis chronic cough chronic sputum production or dyspnea on exertion.   Cardiovascular: Negative for chest pain.        He denies exertional chest pain or rhythm   Gastrointestinal: Positive for nausea and vomiting. Negative for abdominal distention, abdominal pain, anal bleeding, blood in stool, constipation, diarrhea and rectal pain.   Musculoskeletal: Negative for back pain and neck pain.   Skin: Negative for pallor and rash.   Neurological: Negative for dizziness, seizures, syncope, speech difficulty, weakness and numbness.   Hematological: Negative for adenopathy.   Psychiatric/Behavioral: Negative for confusion.       Objective:      Physical Exam  Vitals signs reviewed.   Constitutional:       Appearance: He is well-developed.      Comments: Well-nourished well-hydrated thin afebrile nonicteric white male.  He is normocephalic.  Pupils are normal.  He is sitting comfortably in the chair.  His mother is with him.  She aunts additional history.  He is oriented x3.  He can relate his history and answer questions normally.   HENT:      Head: Normocephalic.   Eyes:      Pupils: Pupils are equal, round, and reactive to light.   Neck:      Musculoskeletal: Normal range of  motion and neck supple.      Thyroid: No thyromegaly.      Trachea: No tracheal deviation.   Cardiovascular:      Rate and Rhythm: Normal rate and regular rhythm.      Heart sounds: Normal heart sounds.   Pulmonary:      Effort: Pulmonary effort is normal.      Breath sounds: Normal breath sounds.   Abdominal:      General: Bowel sounds are normal. There is no distension.      Palpations: Abdomen is soft. There is no mass.      Tenderness: There is no abdominal tenderness. There is no right CVA tenderness, guarding or rebound.      Hernia: No hernia is present.      Comments: The abdomen is soft without tenderness masses organomegaly.  Bowel sounds are normal.   Musculoskeletal: Normal range of motion.      Comments: He can ambulate normally.  He can go from the sitting the standing position without difficulty   Lymphadenopathy:      Cervical: No cervical adenopathy.   Skin:     General: Skin is warm and dry.   Neurological:      Mental Status: He is alert and oriented to person, place, and time.      Cranial Nerves: No cranial nerve deficit.   Psychiatric:         Behavior: Behavior normal.           Plan:       Abdominal pain, unspecified abdominal location  -     US Abdomen Complete; Future; Expected date: 10/23/2020    Gastroesophageal reflux disease, unspecified whether esophagitis present  -     sucralfate (CARAFATE) 1 gram tablet; Take 1 tablet (1 g total) by mouth 3 (three) times daily before meals.  Dispense: 270 tablet; Refill: 3  -     promethazine (PHENERGAN) 50 MG tablet; Take 1 tablet (50 mg total) by mouth every 6 (six) hours as needed for Nausea.  Dispense: 100 tablet; Refill: 0    Nausea  -     promethazine (PHENERGAN) 50 MG tablet; Take 1 tablet (50 mg total) by mouth every 6 (six) hours as needed for Nausea.  Dispense: 100 tablet; Refill: 0    Gastroesophageal reflux disease without esophagitis    Esophageal dysphagia     He will continue his vitamins minerals and reflux regimen.  He will make a  food diary and avoid the offending foods particularly milk products.  Her add more fiber to the diet to produced daily bowel movements.  He scheduled for esophageal motility and ultrasound.  There is a family history of chronic cholecystitis.  He has nonspecific abdominal discomfort.  He will continue his activities.

## 2020-10-27 PROBLEM — R10.9 ABDOMINAL PAIN: Status: ACTIVE | Noted: 2020-10-27

## 2020-10-27 PROBLEM — R11.0 NAUSEA: Status: ACTIVE | Noted: 2020-10-27

## 2022-08-04 ENCOUNTER — OCCUPATIONAL HEALTH (OUTPATIENT)
Dept: URGENT CARE | Facility: CLINIC | Age: 42
End: 2022-08-04

## 2022-08-04 PROCEDURE — 80305 DRUG TEST PRSMV DIR OPT OBS: CPT | Mod: S$GLB,,, | Performed by: EMERGENCY MEDICINE

## 2022-08-04 PROCEDURE — 80305 PR COLLECTION ONLY DRUG SCREEN: ICD-10-PCS | Mod: S$GLB,,, | Performed by: EMERGENCY MEDICINE

## 2023-10-20 ENCOUNTER — OCCUPATIONAL HEALTH (OUTPATIENT)
Dept: URGENT CARE | Facility: CLINIC | Age: 43
End: 2023-10-20

## 2023-10-20 PROCEDURE — 80305 PR COLLECTION ONLY DRUG SCREEN: ICD-10-PCS | Mod: S$GLB,,, | Performed by: EMERGENCY MEDICINE

## 2023-10-20 PROCEDURE — 80305 DRUG TEST PRSMV DIR OPT OBS: CPT | Mod: S$GLB,,, | Performed by: EMERGENCY MEDICINE

## 2025-05-23 ENCOUNTER — OCCUPATIONAL HEALTH (OUTPATIENT)
Dept: URGENT CARE | Facility: CLINIC | Age: 45
End: 2025-05-23

## 2025-05-23 DIAGNOSIS — Z02.83 ENCOUNTER FOR DRUG SCREENING: Primary | ICD-10-CM

## (undated) DEVICE — KIT ENDO CARRY-ON PROC 100310

## (undated) DEVICE — BITE BLOCK ADULT LATEX FREE

## (undated) DEVICE — SOL WATER STRL IRR 1000ML

## (undated) DEVICE — CANISTER SUCTION 3000CC

## (undated) DEVICE — FORCEP BIOSY RJ 4 HOT 2.2X240